# Patient Record
Sex: MALE | Race: ASIAN | NOT HISPANIC OR LATINO
[De-identification: names, ages, dates, MRNs, and addresses within clinical notes are randomized per-mention and may not be internally consistent; named-entity substitution may affect disease eponyms.]

---

## 2018-08-20 ENCOUNTER — TRANSCRIPTION ENCOUNTER (OUTPATIENT)
Age: 38
End: 2018-08-20

## 2018-08-21 ENCOUNTER — RESULT REVIEW (OUTPATIENT)
Age: 38
End: 2018-08-21

## 2018-08-21 ENCOUNTER — INPATIENT (INPATIENT)
Facility: HOSPITAL | Age: 38
LOS: 0 days | Discharge: HOME | DRG: 340 | End: 2018-08-22
Attending: SURGERY | Admitting: SURGERY
Payer: COMMERCIAL

## 2018-08-21 VITALS
RESPIRATION RATE: 17 BRPM | TEMPERATURE: 98 F | WEIGHT: 149.91 LBS | HEART RATE: 70 BPM | OXYGEN SATURATION: 97 % | SYSTOLIC BLOOD PRESSURE: 100 MMHG | DIASTOLIC BLOOD PRESSURE: 68 MMHG

## 2018-08-21 DIAGNOSIS — K35.3 ACUTE APPENDICITIS WITH LOCALIZED PERITONITIS: ICD-10-CM

## 2018-08-21 LAB
ALBUMIN SERPL ELPH-MCNC: 4.6 G/DL — SIGNIFICANT CHANGE UP (ref 3.3–5)
ALP SERPL-CCNC: 67 U/L — SIGNIFICANT CHANGE UP (ref 40–120)
ALT FLD-CCNC: 16 U/L — SIGNIFICANT CHANGE UP (ref 10–45)
ANION GAP SERPL CALC-SCNC: 12 MMOL/L — SIGNIFICANT CHANGE UP (ref 5–17)
AST SERPL-CCNC: 17 U/L — SIGNIFICANT CHANGE UP (ref 10–40)
BASOPHILS # BLD AUTO: 0 K/UL — SIGNIFICANT CHANGE UP (ref 0–0.2)
BASOPHILS NFR BLD AUTO: 0.1 % — SIGNIFICANT CHANGE UP (ref 0–2)
BILIRUB SERPL-MCNC: 0.6 MG/DL — SIGNIFICANT CHANGE UP (ref 0.2–1.2)
BLD GP AB SCN SERPL QL: NEGATIVE — SIGNIFICANT CHANGE UP
BUN SERPL-MCNC: 12 MG/DL — SIGNIFICANT CHANGE UP (ref 7–23)
CALCIUM SERPL-MCNC: 9.6 MG/DL — SIGNIFICANT CHANGE UP (ref 8.4–10.5)
CHLORIDE SERPL-SCNC: 103 MMOL/L — SIGNIFICANT CHANGE UP (ref 96–108)
CO2 SERPL-SCNC: 23 MMOL/L — SIGNIFICANT CHANGE UP (ref 22–31)
CREAT SERPL-MCNC: 0.94 MG/DL — SIGNIFICANT CHANGE UP (ref 0.5–1.3)
EOSINOPHIL # BLD AUTO: 0 K/UL — SIGNIFICANT CHANGE UP (ref 0–0.5)
EOSINOPHIL NFR BLD AUTO: 0.4 % — SIGNIFICANT CHANGE UP (ref 0–6)
GLUCOSE SERPL-MCNC: 126 MG/DL — HIGH (ref 70–99)
HCT VFR BLD CALC: 42.7 % — SIGNIFICANT CHANGE UP (ref 39–50)
HGB BLD-MCNC: 14.1 G/DL — SIGNIFICANT CHANGE UP (ref 13–17)
LIDOCAIN IGE QN: 20 U/L — SIGNIFICANT CHANGE UP (ref 7–60)
LYMPHOCYTES # BLD AUTO: 19.9 % — SIGNIFICANT CHANGE UP (ref 13–44)
LYMPHOCYTES # BLD AUTO: 2.4 K/UL — SIGNIFICANT CHANGE UP (ref 1–3.3)
MCHC RBC-ENTMCNC: 25 PG — LOW (ref 27–34)
MCHC RBC-ENTMCNC: 33 GM/DL — SIGNIFICANT CHANGE UP (ref 32–36)
MCV RBC AUTO: 75.7 FL — LOW (ref 80–100)
MONOCYTES # BLD AUTO: 0.8 K/UL — SIGNIFICANT CHANGE UP (ref 0–0.9)
MONOCYTES NFR BLD AUTO: 6.4 % — SIGNIFICANT CHANGE UP (ref 2–14)
NEUTROPHILS # BLD AUTO: 8.7 K/UL — HIGH (ref 1.8–7.4)
NEUTROPHILS NFR BLD AUTO: 73.2 % — SIGNIFICANT CHANGE UP (ref 43–77)
OB PNL STL: NEGATIVE — SIGNIFICANT CHANGE UP
PLATELET # BLD AUTO: 183 K/UL — SIGNIFICANT CHANGE UP (ref 150–400)
POTASSIUM SERPL-MCNC: 3.7 MMOL/L — SIGNIFICANT CHANGE UP (ref 3.5–5.3)
POTASSIUM SERPL-SCNC: 3.7 MMOL/L — SIGNIFICANT CHANGE UP (ref 3.5–5.3)
PROT SERPL-MCNC: 7.9 G/DL — SIGNIFICANT CHANGE UP (ref 6–8.3)
RBC # BLD: 5.64 M/UL — SIGNIFICANT CHANGE UP (ref 4.2–5.8)
RBC # FLD: 12.8 % — SIGNIFICANT CHANGE UP (ref 10.3–14.5)
RH IG SCN BLD-IMP: POSITIVE — SIGNIFICANT CHANGE UP
RH IG SCN BLD-IMP: POSITIVE — SIGNIFICANT CHANGE UP
SODIUM SERPL-SCNC: 138 MMOL/L — SIGNIFICANT CHANGE UP (ref 135–145)
WBC # BLD: 11.9 K/UL — HIGH (ref 3.8–10.5)
WBC # FLD AUTO: 11.9 K/UL — HIGH (ref 3.8–10.5)

## 2018-08-21 PROCEDURE — 99291 CRITICAL CARE FIRST HOUR: CPT

## 2018-08-21 PROCEDURE — 88304 TISSUE EXAM BY PATHOLOGIST: CPT | Mod: 26

## 2018-08-21 PROCEDURE — 71045 X-RAY EXAM CHEST 1 VIEW: CPT | Mod: 26

## 2018-08-21 RX ORDER — PIPERACILLIN AND TAZOBACTAM 4; .5 G/20ML; G/20ML
3.38 INJECTION, POWDER, LYOPHILIZED, FOR SOLUTION INTRAVENOUS EVERY 8 HOURS
Qty: 0 | Refills: 0 | Status: DISCONTINUED | OUTPATIENT
Start: 2018-08-21 | End: 2018-08-21

## 2018-08-21 RX ORDER — PIPERACILLIN AND TAZOBACTAM 4; .5 G/20ML; G/20ML
3.38 INJECTION, POWDER, LYOPHILIZED, FOR SOLUTION INTRAVENOUS EVERY 8 HOURS
Qty: 0 | Refills: 0 | Status: DISCONTINUED | OUTPATIENT
Start: 2018-08-21 | End: 2018-08-22

## 2018-08-21 RX ORDER — KETOROLAC TROMETHAMINE 30 MG/ML
15 SYRINGE (ML) INJECTION ONCE
Qty: 0 | Refills: 0 | Status: DISCONTINUED | OUTPATIENT
Start: 2018-08-21 | End: 2018-08-21

## 2018-08-21 RX ORDER — SODIUM CHLORIDE 9 MG/ML
1000 INJECTION, SOLUTION INTRAVENOUS ONCE
Qty: 0 | Refills: 0 | Status: COMPLETED | OUTPATIENT
Start: 2018-08-21 | End: 2018-08-21

## 2018-08-21 RX ORDER — OXYCODONE HYDROCHLORIDE 5 MG/1
5 TABLET ORAL EVERY 4 HOURS
Qty: 0 | Refills: 0 | Status: DISCONTINUED | OUTPATIENT
Start: 2018-08-21 | End: 2018-08-22

## 2018-08-21 RX ORDER — ENOXAPARIN SODIUM 100 MG/ML
40 INJECTION SUBCUTANEOUS DAILY
Qty: 0 | Refills: 0 | Status: DISCONTINUED | OUTPATIENT
Start: 2018-08-21 | End: 2018-08-22

## 2018-08-21 RX ORDER — OXYCODONE HYDROCHLORIDE 5 MG/1
10 TABLET ORAL EVERY 4 HOURS
Qty: 0 | Refills: 0 | Status: DISCONTINUED | OUTPATIENT
Start: 2018-08-21 | End: 2018-08-22

## 2018-08-21 RX ORDER — ACETAMINOPHEN 500 MG
650 TABLET ORAL EVERY 6 HOURS
Qty: 0 | Refills: 0 | Status: DISCONTINUED | OUTPATIENT
Start: 2018-08-21 | End: 2018-08-22

## 2018-08-21 RX ORDER — ENOXAPARIN SODIUM 100 MG/ML
40 INJECTION SUBCUTANEOUS DAILY
Qty: 0 | Refills: 0 | Status: DISCONTINUED | OUTPATIENT
Start: 2018-08-21 | End: 2018-08-21

## 2018-08-21 RX ORDER — SODIUM CHLORIDE 9 MG/ML
1000 INJECTION, SOLUTION INTRAVENOUS
Qty: 0 | Refills: 0 | Status: DISCONTINUED | OUTPATIENT
Start: 2018-08-21 | End: 2018-08-21

## 2018-08-21 RX ORDER — HYDROMORPHONE HYDROCHLORIDE 2 MG/ML
0.5 INJECTION INTRAMUSCULAR; INTRAVENOUS; SUBCUTANEOUS
Qty: 0 | Refills: 0 | Status: DISCONTINUED | OUTPATIENT
Start: 2018-08-21 | End: 2018-08-22

## 2018-08-21 RX ORDER — DEXTROSE MONOHYDRATE, SODIUM CHLORIDE, AND POTASSIUM CHLORIDE 50; .745; 4.5 G/1000ML; G/1000ML; G/1000ML
1000 INJECTION, SOLUTION INTRAVENOUS
Qty: 0 | Refills: 0 | Status: DISCONTINUED | OUTPATIENT
Start: 2018-08-21 | End: 2018-08-22

## 2018-08-21 RX ORDER — ONDANSETRON 8 MG/1
4 TABLET, FILM COATED ORAL EVERY 4 HOURS
Qty: 0 | Refills: 0 | Status: DISCONTINUED | OUTPATIENT
Start: 2018-08-21 | End: 2018-08-22

## 2018-08-21 RX ORDER — FENTANYL CITRATE 50 UG/ML
50 INJECTION INTRAVENOUS ONCE
Qty: 0 | Refills: 0 | Status: DISCONTINUED | OUTPATIENT
Start: 2018-08-21 | End: 2018-08-21

## 2018-08-21 RX ORDER — PIPERACILLIN AND TAZOBACTAM 4; .5 G/20ML; G/20ML
3.38 INJECTION, POWDER, LYOPHILIZED, FOR SOLUTION INTRAVENOUS ONCE
Qty: 0 | Refills: 0 | Status: COMPLETED | OUTPATIENT
Start: 2018-08-21 | End: 2018-08-21

## 2018-08-21 RX ADMIN — PIPERACILLIN AND TAZOBACTAM 200 GRAM(S): 4; .5 INJECTION, POWDER, LYOPHILIZED, FOR SOLUTION INTRAVENOUS at 12:00

## 2018-08-21 RX ADMIN — SODIUM CHLORIDE 150 MILLILITER(S): 9 INJECTION, SOLUTION INTRAVENOUS at 11:55

## 2018-08-21 RX ADMIN — SODIUM CHLORIDE 150 MILLILITER(S): 9 INJECTION, SOLUTION INTRAVENOUS at 11:49

## 2018-08-21 RX ADMIN — SODIUM CHLORIDE 150 MILLILITER(S): 9 INJECTION, SOLUTION INTRAVENOUS at 12:10

## 2018-08-21 RX ADMIN — OXYCODONE HYDROCHLORIDE 10 MILLIGRAM(S): 5 TABLET ORAL at 22:45

## 2018-08-21 RX ADMIN — ENOXAPARIN SODIUM 40 MILLIGRAM(S): 100 INJECTION SUBCUTANEOUS at 22:17

## 2018-08-21 RX ADMIN — HYDROMORPHONE HYDROCHLORIDE 0.5 MILLIGRAM(S): 2 INJECTION INTRAMUSCULAR; INTRAVENOUS; SUBCUTANEOUS at 19:35

## 2018-08-21 RX ADMIN — SODIUM CHLORIDE 4000 MILLILITER(S): 9 INJECTION, SOLUTION INTRAVENOUS at 12:10

## 2018-08-21 RX ADMIN — FENTANYL CITRATE 50 MICROGRAM(S): 50 INJECTION INTRAVENOUS at 11:55

## 2018-08-21 RX ADMIN — HYDROMORPHONE HYDROCHLORIDE 0.5 MILLIGRAM(S): 2 INJECTION INTRAMUSCULAR; INTRAVENOUS; SUBCUTANEOUS at 20:00

## 2018-08-21 RX ADMIN — OXYCODONE HYDROCHLORIDE 10 MILLIGRAM(S): 5 TABLET ORAL at 22:17

## 2018-08-21 RX ADMIN — PIPERACILLIN AND TAZOBACTAM 25 GRAM(S): 4; .5 INJECTION, POWDER, LYOPHILIZED, FOR SOLUTION INTRAVENOUS at 20:40

## 2018-08-21 RX ADMIN — SODIUM CHLORIDE 4000 MILLILITER(S): 9 INJECTION, SOLUTION INTRAVENOUS at 11:35

## 2018-08-21 NOTE — ED ADULT NURSE NOTE - NSIMPLEMENTINTERV_GEN_ALL_ED
Implemented All Universal Safety Interventions:  [place] to call system. Call bell, personal items and telephone within reach. Instruct patient to call for assistance. Room bathroom lighting operational. Non-slip footwear when patient is off stretcher. Physically safe environment: no spills, clutter or unnecessary equipment. Stretcher in lowest position, wheels locked, appropriate side rails in place.

## 2018-08-21 NOTE — H&P ADULT - NSHPLABSRESULTS_GEN_ALL_CORE
CBC (08-21 @ 11:55)                              14.1                           11.9<H>  )----------------(  183        73.2  % Neutrophils, 19.9  % Lymphocytes, ANC: 8.7<H>                              42.7      BMP (08-21 @ 11:55)             138     |  103     |  12    		Ca++ --      Ca 9.6                ---------------------------------( 126<H>		Mg --                 3.7     |  23      |  0.94  			Ph --        LFTs (08-21 @ 11:55)      TPro 7.9 / Alb 4.6 / TBili 0.6 / DBili -- / AST 17 / ALT 16 / AlkPhos 67          < from: US Abdomen Limited (ED) (08.21.18 @ 12:18) >    The appendix was visualized and scanned in longitudinal and transverse   planes.  The appendix measured 1.2 cm in diameter.  The appendix was not compressible.  An appendicolith was visualized.  There is small periappendiceal  free fluid noted in the right lower   quadrant.    < end of copied text >

## 2018-08-21 NOTE — ED ADULT NURSE REASSESSMENT NOTE - NS ED NURSE REASSESS COMMENT FT1
Wallet, clothing and watch given to co worker MADAY Report given to OR. Pt awake alert and orientedx3 Resp even and nonlab Denies discomfort. Color pink.

## 2018-08-21 NOTE — BRIEF OPERATIVE NOTE - PROCEDURE
<<-----Click on this checkbox to enter Procedure Laparoscopic appendectomy  08/21/2018    Active  AGAINES

## 2018-08-21 NOTE — ED ADULT NURSE NOTE - OBJECTIVE STATEMENT
38 yr old male to ed c/o rlq pain since last night, increasingly worse from the night. Did not take anything for pain. C/o nausea No vomiting C/o "feels like I have a fever." Afebrile at this time. Resp labored. B/p 94/55. C/o rlq pain . increased pain with rebound tenderness. Resp labored. o2 sat 100% r/a. LR infusing. 2 IV lines. Dr Jonas at bedside. Pt lethargic, shivering but arousable. Bilat breath sounds clear No wheezing. pale in color .Ultrasound done at bedside Kept NPO Surg consult called.

## 2018-08-21 NOTE — ED PROVIDER NOTE - OBJECTIVE STATEMENT
39 yo male presents to the ED brought in by his college for severe abdominal pain and altered mental status. Patient somnolent but A&O x3, appears diaphoretic and very uncomfortable. States his abdominal pain began last night and felt like burning but he went to bed hoping it would go away. In the morning the pain worsened and felt like "corpses exploding inside of me." Patient has no pertinent PMH, no hx of surgery.   Allergies: NKDA  Surgical Hx: none

## 2018-08-21 NOTE — ED PROVIDER NOTE - PROGRESS NOTE DETAILS
Surgery taking patient to OR, CT cancelled by surgery in setting of + appendicitis diagnosed on POCUS by ED attending Dr. Loya

## 2018-08-21 NOTE — H&P ADULT - HISTORY OF PRESENT ILLNESS
38M only pmhx hypogonadism on testosterone, who presents with lower abdominal pain since last night. The patient reports his pain started around 10pm, but that he tried to sleep through it. When he woke up this morning the pain was still present, and more intense. Later this morning the pain migrated to the RLQ, and continued to escalate in intensity. He described it as sharp, non-radiating, and a 9/10. The patient went to work, but was brought to the ED by his boss who observed him to appear ill. In the ED the patient reports he began blacking out, unaware of how he got from the waiting room to an exam room. He endorses some associated nausea, no emesis, no diarrhea or dysuria. He denies any subjective fevers or chills.     In the ED the patient was noted to be somewhat hypotensive, with SBP in the  range. He received 2 liters of crystaloid as well as a dose of zosyn, and fentanyl for pain.

## 2018-08-21 NOTE — ED PROVIDER NOTE - ATTENDING CONTRIBUTION TO CARE
------------ATTENDING NOTE------------   pt c/o 12 hrs of gradually increasing abdominal pain, initially mild mid abdomen then migrating to constant severe stabbing pain in RLQ, worse w/ any movement, subjective fever at home, associated nausea, exam c/w surgical abdomen and US w/ acute appendicitis, IVF and IV Antibiotics, concerning as hypotension at times, aggressive IVF and early antibiotics, immediate Acute Care Surgery consult -->  - Demetrius Jonas MD   ----------------------------------------------

## 2018-08-21 NOTE — H&P ADULT - ASSESSMENT
Assessment:  38M with no relevant pmhx who presents with 1 day of symptoms and US findings consistent with acute appendicitis.     Plan:  - Admit to [facility] team surgery under Dr. Padilla  - NPO / IVF   - Continue zosyn  - Patient added to OR schedule for emergent lap appy  - Consent signed & in chart    Seen and examined with Dr. Valerie Martino, PGY-2  [facility] Surgery [phone]

## 2018-08-21 NOTE — CHART NOTE - NSCHARTNOTEFT_GEN_A_CORE
Post-operative Check    SUBJECTIVE: No acute events in the immediate post-operative period. Pain well controlled. Denies n/v. Has voided. Has not had clears yet. Feels much better than before surgery. Denies cp/sob. Denies f/c.    OBJECTIVE:  T(C): 36.4 (08-21-18 @ 18:43), Max: 37 (08-21-18 @ 13:43)  HR: 727 (08-21-18 @ 20:00) (66 - 727)  BP: 101/61 (08-21-18 @ 20:00) (94/55 - 117/80)  RR: 16 (08-21-18 @ 20:00) (14 - 22)  SpO2: 98% (08-21-18 @ 20:00) (97% - 100%)      08-21-18 @ 07:01  -  08-21-18 @ 20:17  --------------------------------------------------------  IN: 225 mL / OUT: 900 mL / NET: -675 mL        Physical Exam:     NAD, awake and alert  Respirations nonlabored  Abdomen soft, nontender, nondistended, incision sites c/d/i  No guarding or rebound tenderness      ASSESSMENT:   SABRINA MORELAND is a 38y Male POD#0 from lap appy. Pt stable in PACU w/o tenderness, progressing well postoperatively.     PLAN:  - Pain management  - regular diet  - Follow UOP  - zosyn  - IVF  - Kulwant, Evan3ropriate for transfer to the [facility] Post-operative Check    SUBJECTIVE: No acute events in the immediate post-operative period. Pain well controlled. Denies n/v. Has voided. Has not had clears yet. Feels much better than before surgery. Denies cp/sob. Denies f/c.    OBJECTIVE:  T(C): 36.4 (08-21-18 @ 18:43), Max: 37 (08-21-18 @ 13:43)  HR: 727 (08-21-18 @ 20:00) (66 - 727)  BP: 101/61 (08-21-18 @ 20:00) (94/55 - 117/80)  RR: 16 (08-21-18 @ 20:00) (14 - 22)  SpO2: 98% (08-21-18 @ 20:00) (97% - 100%)      08-21-18 @ 07:01  -  08-21-18 @ 20:17  --------------------------------------------------------  IN: 225 mL / OUT: 900 mL / NET: -675 mL        Physical Exam:     NAD, awake and alert  Respirations nonlabored  Abdomen soft, nontender, nondistended, incision sites c/d/i  No guarding or rebound tenderness      ASSESSMENT:   SABRINA MORELAND is a 38y Male POD#0 from lap appy. Pt stable in PACU w/o tenderness, progressing well postoperatively.     PLAN:  - Pain management  - regular diet  - Follow UOP  - zosyn  - IVF, d/c when drinknig appropriately  - can transfer to floor    [place], [id number]

## 2018-08-21 NOTE — H&P ADULT - NSHPPHYSICALEXAM_GEN_ALL_CORE
Gen: AAOx3, moderately distressed, mentating normally  Neuro: Cranial nerves II-XII grossly intact  HEENT: Atraumatic, normocephalic  CV: RRR, normal S1/S2, no audible m/r/g  Pulm: Breathing comfortably on RA. Equal chest rise b/l. Lung fields CTAB  Abd: Soft, tender in the RLQ, mildly distended. No rebound or guarding.  Back/flank: No CVA tenderness  Extremities: WWP. Moving all 4 extremities spontaneously. Strength 5/5. Sensation intact  Skin: No rashes or suspicious lesions

## 2018-08-22 ENCOUNTER — TRANSCRIPTION ENCOUNTER (OUTPATIENT)
Age: 38
End: 2018-08-22

## 2018-08-22 VITALS
SYSTOLIC BLOOD PRESSURE: 109 MMHG | HEART RATE: 79 BPM | DIASTOLIC BLOOD PRESSURE: 66 MMHG | OXYGEN SATURATION: 97 % | TEMPERATURE: 99 F | RESPIRATION RATE: 18 BRPM

## 2018-08-22 LAB
ANION GAP SERPL CALC-SCNC: 11 MMOL/L — SIGNIFICANT CHANGE UP (ref 5–17)
BUN SERPL-MCNC: 6 MG/DL — LOW (ref 7–23)
CALCIUM SERPL-MCNC: 8.8 MG/DL — SIGNIFICANT CHANGE UP (ref 8.4–10.5)
CHLORIDE SERPL-SCNC: 105 MMOL/L — SIGNIFICANT CHANGE UP (ref 96–108)
CO2 SERPL-SCNC: 24 MMOL/L — SIGNIFICANT CHANGE UP (ref 22–31)
CREAT SERPL-MCNC: 0.85 MG/DL — SIGNIFICANT CHANGE UP (ref 0.5–1.3)
GLUCOSE SERPL-MCNC: 124 MG/DL — HIGH (ref 70–99)
HCT VFR BLD CALC: 38.5 % — LOW (ref 39–50)
HGB BLD-MCNC: 12.1 G/DL — LOW (ref 13–17)
MAGNESIUM SERPL-MCNC: 2.2 MG/DL — SIGNIFICANT CHANGE UP (ref 1.6–2.6)
MCHC RBC-ENTMCNC: 24.4 PG — LOW (ref 27–34)
MCHC RBC-ENTMCNC: 31.4 GM/DL — LOW (ref 32–36)
MCV RBC AUTO: 77.6 FL — LOW (ref 80–100)
PHOSPHATE SERPL-MCNC: 3 MG/DL — SIGNIFICANT CHANGE UP (ref 2.5–4.5)
PLATELET # BLD AUTO: 171 K/UL — SIGNIFICANT CHANGE UP (ref 150–400)
POTASSIUM SERPL-MCNC: 4.2 MMOL/L — SIGNIFICANT CHANGE UP (ref 3.5–5.3)
POTASSIUM SERPL-SCNC: 4.2 MMOL/L — SIGNIFICANT CHANGE UP (ref 3.5–5.3)
RBC # BLD: 4.96 M/UL — SIGNIFICANT CHANGE UP (ref 4.2–5.8)
RBC # FLD: 14.6 % — HIGH (ref 10.3–14.5)
SODIUM SERPL-SCNC: 140 MMOL/L — SIGNIFICANT CHANGE UP (ref 135–145)
WBC # BLD: 8.06 K/UL — SIGNIFICANT CHANGE UP (ref 3.8–10.5)
WBC # FLD AUTO: 8.06 K/UL — SIGNIFICANT CHANGE UP (ref 3.8–10.5)

## 2018-08-22 PROCEDURE — 86901 BLOOD TYPING SEROLOGIC RH(D): CPT

## 2018-08-22 PROCEDURE — 82962 GLUCOSE BLOOD TEST: CPT

## 2018-08-22 PROCEDURE — 82272 OCCULT BLD FECES 1-3 TESTS: CPT

## 2018-08-22 PROCEDURE — 71045 X-RAY EXAM CHEST 1 VIEW: CPT

## 2018-08-22 PROCEDURE — 76705 ECHO EXAM OF ABDOMEN: CPT

## 2018-08-22 PROCEDURE — 88304 TISSUE EXAM BY PATHOLOGIST: CPT

## 2018-08-22 PROCEDURE — 96375 TX/PRO/DX INJ NEW DRUG ADDON: CPT

## 2018-08-22 PROCEDURE — 80053 COMPREHEN METABOLIC PANEL: CPT

## 2018-08-22 PROCEDURE — 86900 BLOOD TYPING SEROLOGIC ABO: CPT

## 2018-08-22 PROCEDURE — 86850 RBC ANTIBODY SCREEN: CPT

## 2018-08-22 PROCEDURE — 83735 ASSAY OF MAGNESIUM: CPT

## 2018-08-22 PROCEDURE — 96374 THER/PROPH/DIAG INJ IV PUSH: CPT

## 2018-08-22 PROCEDURE — 80048 BASIC METABOLIC PNL TOTAL CA: CPT

## 2018-08-22 PROCEDURE — 83690 ASSAY OF LIPASE: CPT

## 2018-08-22 PROCEDURE — 85027 COMPLETE CBC AUTOMATED: CPT

## 2018-08-22 PROCEDURE — 84100 ASSAY OF PHOSPHORUS: CPT

## 2018-08-22 PROCEDURE — 99285 EMERGENCY DEPT VISIT HI MDM: CPT

## 2018-08-22 RX ORDER — DOCUSATE SODIUM 100 MG
1 CAPSULE ORAL
Qty: 90 | Refills: 0 | OUTPATIENT
Start: 2018-08-22

## 2018-08-22 RX ORDER — ACETAMINOPHEN 500 MG
2 TABLET ORAL
Qty: 0 | Refills: 0 | COMMUNITY
Start: 2018-08-22

## 2018-08-22 RX ORDER — OXYCODONE HYDROCHLORIDE 5 MG/1
1 TABLET ORAL
Qty: 30 | Refills: 0 | OUTPATIENT
Start: 2018-08-22

## 2018-08-22 RX ADMIN — PIPERACILLIN AND TAZOBACTAM 25 GRAM(S): 4; .5 INJECTION, POWDER, LYOPHILIZED, FOR SOLUTION INTRAVENOUS at 04:23

## 2018-08-22 RX ADMIN — Medication 650 MILLIGRAM(S): at 14:08

## 2018-08-22 NOTE — DISCHARGE NOTE ADULT - CARE PLAN
Principal Discharge DX:	Appendicitis, acute, with peritonitis  Goal:	recovery from surgery, pain control, return to normal daily activities  Assessment and plan of treatment:	-Follow up with Dr. Padilla in 2 weeks. Call office to schedule appointment.  -take oxycodone as needed for moderate to severe pain. Tylenol if you are having mild pain. Please be sure not exceed 4000mg of acetaminophen(Tylenol) in a 24 hour period.  -Taking oxycodone for pain might cause constipation. You can take over the counter stool softener Colace to prevent this. Please do not take stool softener if you are having loose bowel movements. Oxycodone can also make you nauseous, sleepy, dizzy and itchy-these are the most common side effects.  -Do not drive or operate machinery while taking oxycodone for pain.  -You may shower daily. Let warm soapy water run over incisions in shower. Do not scrub incisions directly. Pat dry. Do not take tub baths or submerge your incisions in water for 4 weeks to ensure proper healing.  -Walking indoors, outdoors and stairs are ok. No heavy lifting > 10lbs (about the weight of a gallon of milk), strenuous activity such as sports, gym, yoga, etc. for 4 weeks to prevent hernia's at your incision sites.  -If you experience fever > 101, pain not controlled with oxycodone, persistent nausea/vomiting please call your surgeon or return to emergency room immediately.    -Remove outer plastic dressing if you have one 48hrs from surgery. Covering your incisions are white pieces of tape called steri-strips. You can shower with these and they will curl up and begin to fall off on their own in about 7 days.  -You might notice a small amount of blood staining or drainage from your abdominal incisions which is normal as well.  -Your prescriptions were sent to [facility] located near the [facility] the [facility]". They also offer delivery service which you can inquire about as well.

## 2018-08-22 NOTE — PROVIDER CONTACT NOTE (OTHER) - ACTION/TREATMENT ORDERED:
MD notified. Verbalized would possibly order a fluid bolus and would speak to his senior. Will continue to monitor.

## 2018-08-22 NOTE — DISCHARGE NOTE ADULT - PATIENT PORTAL LINK FT
You can access the [facility] Patient Portal, offered by [facility], by registering with the following website: [email or web address]

## 2018-08-22 NOTE — DISCHARGE NOTE ADULT - INSTRUCTIONS
-resume your regular diet    - Do not lift more than 10lb (about the weight of a gallon of milk) for 4 weeks.  No strenuous activity such as running, biking, yoga, etc for 4 weeks. Stairs and walking are ok.

## 2018-08-22 NOTE — DISCHARGE NOTE ADULT - HOSPITAL COURSE
38M with no relevant pmhx who presents with 1 day of symptoms and US findings consistent with acute appendicitis. He was admitted to the green surgery service, made NPO, IVF and IV abx started. He was taken to the OR on 8/21 and underwent a lap appy. post-op course was uncomplicated, diet advanced to regular as tolerated, pain controlled on PO meds, ambulating, voiding freely and stable for d/c home on 8/22.

## 2018-08-22 NOTE — PROGRESS NOTE ADULT - SUBJECTIVE AND OBJECTIVE BOX
GENERAL SURGERY DAILY PROGRESS NOTE:     Subjective:  Pt seen and examined at bedside. No acute events overnight. Pt w/ low heart rate in 50s, palpated at bedside and was found to be 60. Pain well controlled. Denies n/v. Denies f/c. Pt reports feeling improved. tolerating cld.     Objective:  NAD, awake and alert  Respirations nonlabored  Abdomen soft, appropriately tender around incisions, nondistended, incision c/d/i  No guarding or rebound tenderness      MEDICATIONS  (STANDING):  dextrose 5% + sodium chloride 0.45% with potassium chloride 20 mEq/L 1000 milliLiter(s) (75 mL/Hr) IV Continuous <Continuous>  enoxaparin Injectable 40 milliGRAM(s) SubCutaneous daily  piperacillin/tazobactam IVPB. 3.375 Gram(s) IV Intermittent every 8 hours    MEDICATIONS  (PRN):  acetaminophen   Tablet. 650 milliGRAM(s) Oral every 6 hours PRN Mild Pain (1 - 3)  oxyCODONE    IR 5 milliGRAM(s) Oral every 4 hours PRN Moderate Pain (4 - 6)  oxyCODONE    IR 10 milliGRAM(s) Oral every 4 hours PRN Severe Pain (7 - 10)      Vital Signs Last 24 Hrs  T(C): 36.6 (22 Aug 2018 01:28), Max: 37 (21 Aug 2018 13:43)  T(F): 97.9 (22 Aug 2018 01:28), Max: 98.6 (21 Aug 2018 13:43)  HR: 50 (22 Aug 2018 01:28) (50 - 88)  BP: 99/55 (22 Aug 2018 01:28) (94/55 - 117/80)  BP(mean): 68 (22 Aug 2018 00:00) (68 - 87)  RR: 18 (22 Aug 2018 01:28) (14 - 22)  SpO2: 98% (22 Aug 2018 01:28) (97% - 100%)    I&O's Detail    21 Aug 2018 07:01  -  22 Aug 2018 01:52  --------------------------------------------------------  IN:    dextrose 5% + sodium chloride 0.45% with potassium chloride 20 mEq/L: 675 mL    Oral Fluid: 450 mL  Total IN: 1125 mL    OUT:    Voided: 1800 mL  Total OUT: 1800 mL    Total NET: -675 mL          Daily Height in cm: 157.48 (21 Aug 2018 13:43)    Daily     LABS:                        14.1   11.9  )-----------( 183      ( 21 Aug 2018 11:55 )             42.7     08-21    138  |  103  |  12  ----------------------------<  126<H>  3.7   |  23  |  0.94    Ca    9.6      21 Aug 2018 11:55    TPro  7.9  /  Alb  4.6  /  TBili  0.6  /  DBili  x   /  AST  17  /  ALT  16  /  AlkPhos  67  08-21          RADIOLOGY & ADDITIONAL STUDIES:

## 2018-08-22 NOTE — DISCHARGE NOTE ADULT - CARE PROVIDER_API CALL
Ambrose Padilla (MD), Surgery  [address]  [address]  [place] [address]  Phone: [phone]  Fax: [phone]

## 2018-08-22 NOTE — PROVIDER CONTACT NOTE (OTHER) - ACTION/TREATMENT ORDERED:
MD notified. Verbalized he would talk to someone about the HR and let me know. Will continue to monitor.

## 2018-08-22 NOTE — DISCHARGE NOTE ADULT - MEDICATION SUMMARY - MEDICATIONS TO TAKE
I will START or STAY ON the medications listed below when I get home from the hospital:    acetaminophen 325 mg oral tablet  -- 2 tab(s) by mouth every 6 hours, As needed, Mild Pain (1 - 3)  -- Indication: For mild pain    oxyCODONE 5 mg oral tablet  -- 1-2  tab(s) by mouth every 4 to 6 hours, As Needed MDD:8 tabs   -- Indication: For moderate to severe pain    Colace 100 mg oral capsule  -- 1 cap(s) by mouth 3 times a day   -- Medication should be taken with plenty of water.    -- Indication: For constipation

## 2018-08-22 NOTE — PROGRESS NOTE ADULT - ASSESSMENT
38y Male POD#0 from lap appy. Pt stable on floor, with episodes of bradycardia.     PLAN:  - Pain management  - monitor vital signs - bradycardic  - regular diet  - Follow UOP  - zosyn  - IVF, d/c when drinknig appropriately  - can transfer to floor    [facility], [id number].

## 2018-08-22 NOTE — PROGRESS NOTE ADULT - ATTENDING COMMENTS
39 yo w 18 hour hx abdominal pain localizing to RLQ.  Story, imaging c/w acute appendicitis.  Discussed lap appendectomy, Discussed procedure as well as risks (including but not limited to bleeding, infection, injury to hollow viscus),  benefits (pain relief),  and alternatives. Patient ate a banana around 8AM.  Risk of aspiration outweighed by risk of delaying operation
Patient seen/examined.  Agree w above note and plan and have discussed plan w house staff.  Home after diet tolerated    Ambrose Padilla MD

## 2018-08-22 NOTE — DISCHARGE NOTE ADULT - PLAN OF CARE
recovery from surgery, pain control, return to normal daily activities -Follow up with Dr. Padilla in 2 weeks. Call office to schedule appointment.  -take oxycodone as needed for moderate to severe pain. Tylenol if you are having mild pain. Please be sure not exceed 4000mg of acetaminophen(Tylenol) in a 24 hour period.  -Taking oxycodone for pain might cause constipation. You can take over the counter stool softener Colace to prevent this. Please do not take stool softener if you are having loose bowel movements. Oxycodone can also make you nauseous, sleepy, dizzy and itchy-these are the most common side effects.  -Do not drive or operate machinery while taking oxycodone for pain.  -You may shower daily. Let warm soapy water run over incisions in shower. Do not scrub incisions directly. Pat dry. Do not take tub baths or submerge your incisions in water for 4 weeks to ensure proper healing.  -Walking indoors, outdoors and stairs are ok. No heavy lifting > 10lbs (about the weight of a gallon of milk), strenuous activity such as sports, gym, yoga, etc. for 4 weeks to prevent hernia's at your incision sites.  -If you experience fever > 101, pain not controlled with oxycodone, persistent nausea/vomiting please call your surgeon or return to emergency room immediately.    -Remove outer plastic dressing if you have one 48hrs from surgery. Covering your incisions are white pieces of tape called steri-strips. You can shower with these and they will curl up and begin to fall off on their own in about 7 days.  -You might notice a small amount of blood staining or drainage from your abdominal incisions which is normal as well.  -Your prescriptions were sent to [facility] located near the [facility] of the [facility]. They also offer delivery service which you can inquire about as well.

## 2018-08-24 LAB — SURGICAL PATHOLOGY STUDY: SIGNIFICANT CHANGE UP

## 2018-08-27 PROBLEM — Z00.00 ENCOUNTER FOR PREVENTIVE HEALTH EXAMINATION: Status: ACTIVE | Noted: 2018-08-27

## 2018-09-06 ENCOUNTER — APPOINTMENT (OUTPATIENT)
Dept: SURGERY | Facility: CLINIC | Age: 38
End: 2018-09-06
Payer: COMMERCIAL

## 2018-09-06 VITALS
DIASTOLIC BLOOD PRESSURE: 79 MMHG | TEMPERATURE: 97.9 F | WEIGHT: 150 LBS | HEIGHT: 62 IN | BODY MASS INDEX: 27.6 KG/M2 | HEART RATE: 85 BPM | OXYGEN SATURATION: 96 % | SYSTOLIC BLOOD PRESSURE: 129 MMHG | RESPIRATION RATE: 16 BRPM

## 2018-09-06 DIAGNOSIS — Z09 ENCOUNTER FOR FOLLOW-UP EXAMINATION AFTER COMPLETED TREATMENT FOR CONDITIONS OTHER THAN MALIGNANT NEOPLASM: ICD-10-CM

## 2018-09-06 DIAGNOSIS — Z87.19 PERSONAL HISTORY OF OTHER DISEASES OF THE DIGESTIVE SYSTEM: ICD-10-CM

## 2018-09-06 DIAGNOSIS — Z78.9 OTHER SPECIFIED HEALTH STATUS: ICD-10-CM

## 2018-09-06 PROCEDURE — 99024 POSTOP FOLLOW-UP VISIT: CPT

## 2018-09-06 RX ORDER — TESTOSTERONE ENANTHATE 200 MG/ML
200 INJECTION, SOLUTION INTRAMUSCULAR
Refills: 0 | Status: ACTIVE | COMMUNITY

## 2019-11-07 NOTE — PRE-OP CHECKLIST - SURGICAL CONSENT
1.  Please contact me after your CT scan is completed in one month.    To schedule your:  CT Scan, please call [phone]     Thank you for choosing the Pulmonary Services Department, at [facility], as your Pulmonary Specialists.  We actively use feedback to constantly improve and deliver the best care possible. To provide the best experience, we are collecting feedback from you on how we performed.  You may receive a survey in the mail or through your e-mail to evaluate how we did. Please take a moment and share your thoughts.      If for any reason you feel that we did not meet your expectations or you want to share a positive experience, please give us a call. Your feedback helps us know how we are doing and what we can be doing better.    Office hours: 8:00 am to 4:30 pm, Monday - Friday  Phone: [phone] Option #2      YOUR TEST RESULTS    If you have any concerns regarding any testing ordered with your visit, please contact our office at the above number.    Otherwise, your test results will be communicated to you in one of the following ways:    - Follow-up office visit  - Phone call  - Through [facility]  - Mailed letter           done

## 2020-02-08 ENCOUNTER — TRANSCRIPTION ENCOUNTER (OUTPATIENT)
Age: 40
End: 2020-02-08

## 2021-10-22 NOTE — ED PROVIDER NOTE - CROS ED HEME ALL NEG
negative... Burow's Graft Text: The defect edges were debeveled with a #15 scalpel blade.  Given the location of the defect, shape of the defect, the proximity to free margins and the presence of a standing cone deformity a Burow's skin graft was deemed most appropriate. The standing cone was removed and this tissue was then trimmed to the shape of the primary defect. The adipose tissue was also removed until only dermis and epidermis were left.  The skin margins of the secondary defect were undermined to an appropriate distance in all directions utilizing iris scissors.  The secondary defect was closed with interrupted buried subcutaneous sutures.  The skin edges were then re-apposed with running  sutures.  The skin graft was then placed in the primary defect and oriented appropriately.